# Patient Record
Sex: MALE | Race: WHITE | NOT HISPANIC OR LATINO | URBAN - METROPOLITAN AREA
[De-identification: names, ages, dates, MRNs, and addresses within clinical notes are randomized per-mention and may not be internally consistent; named-entity substitution may affect disease eponyms.]

---

## 2024-03-19 ENCOUNTER — OFFICE (OUTPATIENT)
Dept: URBAN - METROPOLITAN AREA CLINIC 11 | Facility: CLINIC | Age: 44
End: 2024-03-19

## 2024-03-19 VITALS
DIASTOLIC BLOOD PRESSURE: 113 MMHG | HEART RATE: 75 BPM | SYSTOLIC BLOOD PRESSURE: 157 MMHG | OXYGEN SATURATION: 96 % | WEIGHT: 165 LBS | HEIGHT: 69 IN

## 2024-03-19 DIAGNOSIS — R19.4 CHANGE IN BOWEL HABIT: ICD-10-CM

## 2024-03-19 DIAGNOSIS — R10.9 UNSPECIFIED ABDOMINAL PAIN: ICD-10-CM

## 2024-03-19 DIAGNOSIS — R19.5 OTHER FECAL ABNORMALITIES: ICD-10-CM

## 2024-03-19 DIAGNOSIS — K21.9 GASTRO-ESOPHAGEAL REFLUX DISEASE WITHOUT ESOPHAGITIS: ICD-10-CM

## 2024-03-19 PROCEDURE — 99204 OFFICE O/P NEW MOD 45 MIN: CPT | Performed by: NURSE PRACTITIONER

## 2024-03-19 RX ORDER — PANTOPRAZOLE SODIUM 40 MG/1
40 TABLET, DELAYED RELEASE ORAL
Qty: 30 | Refills: 4 | Status: ACTIVE
Start: 2024-03-19

## 2024-03-19 NOTE — SERVICEHPINOTES
Mr. Mensah is a 43 year old male here today with complaints of GERD, abdominal pain, altered bowel habits. He states a year ago while living in [place] he had an episode of intermittent epigastric pain that lasted a week then resolved.  Two months ago, he started having epigastric pain  that lasted 2 weeks. He did not have a bowel movement for 10 days during that time and took a laxative.  His bowel movements returned to normal a few days later but he states they were dark green at times. He usually has bowel movements 1 to 2 times a day.   He states for the last 3 weeks he has had an upset stomach and loose stools.  He has been taking Pepto-Bismol and this has provided some relief. His bowel habits go between loose stools or constipation.  He denies any medication or dietary changes. His heartburn and reflux have improved since he stopped vaping on 11/16/23.  He denies any nausea, vomiting, blood in stool.

## 2024-03-19 NOTE — SERVICENOTES
will check the above to further evaluate epigastric pain and loose stools.   Will start him on pantoprazole as well.  Will see back in 2 months or sooner if needed